# Patient Record
Sex: MALE | Race: WHITE | Employment: FULL TIME | URBAN - METROPOLITAN AREA
[De-identification: names, ages, dates, MRNs, and addresses within clinical notes are randomized per-mention and may not be internally consistent; named-entity substitution may affect disease eponyms.]

---

## 2021-09-29 ENCOUNTER — HOSPITAL ENCOUNTER (EMERGENCY)
Facility: CLINIC | Age: 61
Discharge: HOME | End: 2021-09-29
Attending: PHYSICIAN ASSISTANT | Admitting: PHYSICIAN ASSISTANT
Payer: OTHER MISCELLANEOUS

## 2021-09-29 ENCOUNTER — APPOINTMENT (OUTPATIENT)
Dept: GENERAL RADIOLOGY | Facility: CLINIC | Age: 61
End: 2021-09-29
Attending: EMERGENCY MEDICINE
Payer: OTHER MISCELLANEOUS

## 2021-09-29 VITALS
BODY MASS INDEX: 26.48 KG/M2 | RESPIRATION RATE: 16 BRPM | HEIGHT: 70 IN | HEART RATE: 60 BPM | TEMPERATURE: 97.8 F | DIASTOLIC BLOOD PRESSURE: 87 MMHG | OXYGEN SATURATION: 98 % | SYSTOLIC BLOOD PRESSURE: 147 MMHG | WEIGHT: 185 LBS

## 2021-09-29 DIAGNOSIS — S51.012A ELBOW LACERATION, LEFT, INITIAL ENCOUNTER: ICD-10-CM

## 2021-09-29 LAB
ANION GAP SERPL CALCULATED.3IONS-SCNC: 9 MMOL/L (ref 3–14)
ATRIAL RATE - MUSE: 61 BPM
BASOPHILS # BLD AUTO: 0.1 10E3/UL (ref 0–0.2)
BASOPHILS NFR BLD AUTO: 1 %
BUN SERPL-MCNC: 17 MG/DL (ref 7–30)
CALCIUM SERPL-MCNC: 8.7 MG/DL (ref 8.5–10.1)
CHLORIDE BLD-SCNC: 106 MMOL/L (ref 94–109)
CO2 SERPL-SCNC: 23 MMOL/L (ref 20–32)
CREAT SERPL-MCNC: 1.05 MG/DL (ref 0.66–1.25)
DIASTOLIC BLOOD PRESSURE - MUSE: NORMAL MMHG
EOSINOPHIL # BLD AUTO: 0.2 10E3/UL (ref 0–0.7)
EOSINOPHIL NFR BLD AUTO: 3 %
ERYTHROCYTE [DISTWIDTH] IN BLOOD BY AUTOMATED COUNT: 12.3 % (ref 10–15)
GFR SERPL CREATININE-BSD FRML MDRD: 76 ML/MIN/1.73M2
GLUCOSE BLD-MCNC: 136 MG/DL (ref 70–99)
HCT VFR BLD AUTO: 42 % (ref 40–53)
HGB BLD-MCNC: 14.2 G/DL (ref 13.3–17.7)
IMM GRANULOCYTES # BLD: 0 10E3/UL
IMM GRANULOCYTES NFR BLD: 0 %
INR PPP: 1.09 (ref 0.85–1.15)
INTERPRETATION ECG - MUSE: NORMAL
LYMPHOCYTES # BLD AUTO: 1.4 10E3/UL (ref 0.8–5.3)
LYMPHOCYTES NFR BLD AUTO: 26 %
MCH RBC QN AUTO: 31.1 PG (ref 26.5–33)
MCHC RBC AUTO-ENTMCNC: 33.8 G/DL (ref 31.5–36.5)
MCV RBC AUTO: 92 FL (ref 78–100)
MONOCYTES # BLD AUTO: 0.6 10E3/UL (ref 0–1.3)
MONOCYTES NFR BLD AUTO: 12 %
NEUTROPHILS # BLD AUTO: 3.2 10E3/UL (ref 1.6–8.3)
NEUTROPHILS NFR BLD AUTO: 58 %
NRBC # BLD AUTO: 0 10E3/UL
NRBC BLD AUTO-RTO: 0 /100
P AXIS - MUSE: 54 DEGREES
PLATELET # BLD AUTO: 275 10E3/UL (ref 150–450)
POTASSIUM BLD-SCNC: 3.7 MMOL/L (ref 3.4–5.3)
PR INTERVAL - MUSE: 166 MS
QRS DURATION - MUSE: 106 MS
QT - MUSE: 408 MS
QTC - MUSE: 410 MS
R AXIS - MUSE: 23 DEGREES
RBC # BLD AUTO: 4.57 10E6/UL (ref 4.4–5.9)
SARS-COV-2 RNA RESP QL NAA+PROBE: NEGATIVE
SODIUM SERPL-SCNC: 138 MMOL/L (ref 133–144)
SYSTOLIC BLOOD PRESSURE - MUSE: NORMAL MMHG
T AXIS - MUSE: 36 DEGREES
VENTRICULAR RATE- MUSE: 61 BPM
WBC # BLD AUTO: 5.5 10E3/UL (ref 4–11)

## 2021-09-29 PROCEDURE — 36415 COLL VENOUS BLD VENIPUNCTURE: CPT | Performed by: EMERGENCY MEDICINE

## 2021-09-29 PROCEDURE — 85610 PROTHROMBIN TIME: CPT | Performed by: EMERGENCY MEDICINE

## 2021-09-29 PROCEDURE — 99285 EMERGENCY DEPT VISIT HI MDM: CPT | Mod: 25

## 2021-09-29 PROCEDURE — 96365 THER/PROPH/DIAG IV INF INIT: CPT

## 2021-09-29 PROCEDURE — 250N000011 HC RX IP 250 OP 636: Performed by: EMERGENCY MEDICINE

## 2021-09-29 PROCEDURE — 80048 BASIC METABOLIC PNL TOTAL CA: CPT | Performed by: EMERGENCY MEDICINE

## 2021-09-29 PROCEDURE — 12044 INTMD RPR N-HF/GENIT7.6-12.5: CPT

## 2021-09-29 PROCEDURE — 87635 SARS-COV-2 COVID-19 AMP PRB: CPT | Performed by: EMERGENCY MEDICINE

## 2021-09-29 PROCEDURE — 85025 COMPLETE CBC W/AUTO DIFF WBC: CPT | Performed by: EMERGENCY MEDICINE

## 2021-09-29 PROCEDURE — C9803 HOPD COVID-19 SPEC COLLECT: HCPCS

## 2021-09-29 PROCEDURE — 73080 X-RAY EXAM OF ELBOW: CPT | Mod: LT

## 2021-09-29 RX ORDER — CEFAZOLIN SODIUM 2 G/100ML
2 INJECTION, SOLUTION INTRAVENOUS ONCE
Status: COMPLETED | OUTPATIENT
Start: 2021-09-29 | End: 2021-09-29

## 2021-09-29 RX ORDER — CEPHALEXIN 500 MG/1
500 CAPSULE ORAL 4 TIMES DAILY
Qty: 24 CAPSULE | Refills: 0 | Status: SHIPPED | OUTPATIENT
Start: 2021-09-30 | End: 2021-10-06

## 2021-09-29 RX ADMIN — CEFAZOLIN SODIUM 2 G: 2 INJECTION, SOLUTION INTRAVENOUS at 11:41

## 2021-09-29 ASSESSMENT — ENCOUNTER SYMPTOMS
WOUND: 1
BACK PAIN: 0
NECK PAIN: 0
BRUISES/BLEEDS EASILY: 0
HEADACHES: 0

## 2021-09-29 ASSESSMENT — MIFFLIN-ST. JEOR: SCORE: 1645.4

## 2021-09-29 NOTE — DISCHARGE INSTRUCTIONS
You came to the ER with laceration of your left forearm.  Orthopedics saw you at the bedside and felt that this did not require washout in the operating room.  They did encourage you to continue taking antibiotics to prevent infection and follow-up in their clinic in 2 weeks time for suture removal/wound recheck.  In time, please keep the area dressed with a bulky dressing to minimize undue stress on the wound that might lead to wound opening.  If you notice excessive redness or warmth around the suture site or pus/discharge, please return for evaluation as this could be a sign of infection.

## 2021-09-29 NOTE — ED PROVIDER NOTES
"  History   Chief Complaint:  Laceration and Fall     HPI History provided by the patient.  Andrea Reed is a 60 year old male who presents for evaluation of a complex laceration to his left elbow after a mechanical fall. The patient was at work when he fell off the second step of a ladder with his left elbow landing on a drill. He arrives with his left arm wrapped in gauze without bleeding controlled. No obvious foreign body.  No numbness or weakness in the left hand. He is not on blood thinners. He denies hitting his head, loss of consciousness, or other injury. No neck or back pain. He states that he had 4 [employer] breakfast biscuits at 6:15AM this morning.    Review of Systems   Musculoskeletal: Negative for back pain and neck pain.   Skin: Positive for wound.   Neurological: Negative for syncope and headaches.   Hematological: Does not bruise/bleed easily.   All other systems reviewed and are negative.    Allergies:  The patient has no known allergies.     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   Marital Status:   PCP: Eliud Hunt    Physical Exam     Patient Vitals for the past 24 hrs:   BP Temp Temp src Pulse Resp SpO2 Height Weight   09/29/21 1325 -- -- -- 60 -- 98 % -- --   09/29/21 1056 (!) 147/87 97.8  F (36.6  C) Temporal 91 16 98 % 1.778 m (5' 10\") 83.9 kg (185 lb)       Physical Exam  VS: Reviewed per above  HENT: normal speech  EYES: sclera anicteric  CV: Rate as noted, regular rhythm.   RESP: Effort " normal.   NEURO: Alert, moving all extremities  MSK: No deformity of the extremities.  Large 9 cm deep laceration to the left proximal forearm, near the elbow.  Intact left DP pulse.  Sensation and motor intact in the distal left upper extremity.  Multiple arteriolar bleeding sources from deep within the wound.  SKIN: Warm and dry    Emergency Department Course     ECG:  ECG taken at 1117, ECG read at 1124  Normal sinus rhythm  Possible Inferior infarct, age undetermined   Cannot rule out Anterior infarct, age undetermined  Abnormal ECG  When compared to prior ECG from 01/18/14: no significant change was found  Rate 61 bpm. GA interval 166 ms. QRS duration 106 ms. QT/QTc 408/410 ms. P-R-T axes 54 23 36.    Imaging:  XR Elbow Left G/E 3 Views  Lateral view is oblique. Cannot evaluate for joint   effusion. No evidence of acute fracture or radiopaque foreign body.   Possible small loose body in the elbow joint measuring up to 0.3 cm.    Reading per radiology.    Laboratory:  CBC: WBC: 5.5, HGB: 14.2, PLT: 275    BMP: Glucose 136 (H), o/w WNL (Creatinine: 1.05)    INR: 1.09    Asymptomatic COVID-19 PCR: Negative    Procedures      Laceration Repair        LACERATION:  A complex clean 9 cm skin laceration.      LOCATION:  Left proximal forearm      FUNCTION:  Distally sensation, circulation, motor and tendon function are intact.      ANESTHESIA:  Local using lido with epi total of 8 mLs      PREPARATION:  Irrigation with Normal Saline      DEBRIDEMENT:  no debridement      CLOSURE:  Wound was closed with Two Layers: 7cm Fascial layer defect closed with 3 x 3.0 Vicryl Sutures. 9cm Skin defect closed with 14 x 3.0 Ethylon using interrupted sutures    Emergency Department Course:    Reviewed:  I reviewed the patient's nursing notes, vitals and past medical history.     Assessments:  1100 I performed an exam of the patient in room ED28 as documented above.  1120 Patient rechecked and updated.    1210 Patient rechecked.    1231 I performed laceration repair as indicated above.    Consults:    1115 I consulted with Dr. Holloway from orthopedics. He will evaluate the patient in the ED.  1230 I spoke with Clara Osborn PA-C from [facility] who is working with Dr. Holloway. The did not appreciate joint involvement and bleeding is controlled. Will plan for outpatient involvement vs. Emergent OR.    Interventions:  1141 Ancef, 2 g, IV    Disposition:  The patient was discharged to home.     Impression & Plan     Medical Decision Making:  Andrea Reed is a 60 year old male who presents to the emergency department today for evaluation of left proximal forearm laceration.  On arrival vital signs are reassuring.  On exam CMS is intact in the distal left upper extremity.  No history or exam to suggest other trauma during this fall.  On initial assessment, there is significant bruising and likely arteriolar bleeding from deep within the wound.  I empirically placed Surgifoam gauze and covered this with pressure dressing.  I was also concerned of involvement about the left elbow joint capsule.  I called orthopedics who came to bedside to evaluate the wound.  At this time, bleeding had largely improved and they did not feel that wound extended into the left elbow joint itself.  Recommendation was for bedside repair, prophylactic antibiotics and follow-up in their clinic.  This was performed cording to procedure note above.  Patient was given Ancef here in the ER and discharged with Keflex.  X-ray did not show significant fracture or foreign body or dislocation.  Bulky dressing was placed and encourage patient to limit range of motion about the left elbow to prevent wound dehiscence.  Plan for suture removal in 14 days.  Return precautions were discussed prior to discharge.      Diagnosis:    ICD-10-CM    1. Elbow laceration, left, initial encounter  S51.012A      Discharge Medications:   New Prescriptions    CEPHALEXIN (KEFLEX) 500 MG CAPSULE    Take 1  capsule (500 mg) by mouth 4 times daily for 6 days     Scribe Disclosure:  I, Eunice Canelawolf, am serving as a scribe at 11:00 AM on 9/29/2021 to document services personally performed by Steven Magana MD, based on my observations and the provider's statements to me.    This note was completed in part using Dragon voice recognition software. Although reviewed after completion, some word and grammatical errors may occur.      Steven Magana MD  09/29/21 [id number]

## 2021-09-29 NOTE — CONSULTS
Consult Date: 2021    CHIEF COMPLAINT:  Left arm injury.    HISTORY OF PRESENT ILLNESS:  The Emergency Room team has requested an Orthopedic evaluation in the Emergency Room for this 60-year-old male who fell from a ladder and sustained a laceration about his left posteromedial elbow from a tool that he fell onto.  There is concern regarding involvement of the elbow joint and a deep laceration.  The patient has no contributory medical problems.    PHYSICAL EXAMINATION:    EXTREMITIES:  He is able to flex and extend his fingers.  He has intact sensation in all digits.  His hand is perfused.  Wound evaluation shows no evidence of pulsatile bleeding at present.  There is violation of the flexor pronator fascia, which is superficial.  The most proximal aspect of the laceration is clearly distal to the elbow joint.    IMPRESSION:  Complex left forearm laceration.    RECOMMENDATIONS:  I believe that irrigation, debridement and closure can be done in the Emergency Room.  There no evidence of neurovascular or joint involvement.  This was relayed to the ER staff.  We will proceed with the remainder of his care.  He may followup with the hand team as an outpatient.    Yash Holloway MD        D: 2021   T: 2021   MT: [id number]    Name:     AIME MANE  MRN:      [id number]        Account:      [id number]   :      1960           Consult Date: 2021     Document: [id number]

## 2021-09-29 NOTE — ED TRIAGE NOTES
pt fell off 4ft ladder. Was on second step. Fell and landed on lt elbow. Bleeding profusely  thinks may have fallen on drill